# Patient Record
(demographics unavailable — no encounter records)

---

## 2020-01-20 DIAGNOSIS — R09.02 HYPOXEMIA: Primary | ICD-10-CM

## 2020-01-20 NOTE — PROGRESS NOTES
Nocturnal pulse oximetry done on 01/15 with panel using his CPAP without oxygen showed he has significant hypoxemia with mean O2 saturation of 80% and kenan of 78%  He spent 266 minutes less than or equal to 88%  I will have him use 3 L of oxygen with his CPAP machine at bedtime    He does use 3 L during the day

## 2022-04-07 DIAGNOSIS — R06.02 SHORTNESS OF BREATH: ICD-10-CM

## 2022-04-07 DIAGNOSIS — R05.9 COUGH: Primary | ICD-10-CM

## 2022-06-30 DIAGNOSIS — R05.9 COUGH: Primary | ICD-10-CM

## 2022-06-30 NOTE — ADDENDUM NOTE
Addended by: Ayaz Henderson on: 6/30/2022 12:02 PM     Modules accepted: Orders, Level of Service, SmartSet